# Patient Record
(demographics unavailable — no encounter records)

---

## 2024-10-07 NOTE — PHYSICAL EXAM
[Alert] : alert [Well Nourished] : well nourished [No Acute Distress] : no acute distress [Well Developed] : well developed [Normal Sclera/Conjunctiva] : normal sclera/conjunctiva [EOMI] : extra ocular movement intact [No Proptosis] : no proptosis [Normal Oropharynx] : the oropharynx was normal [Thyroid Not Enlarged] : the thyroid was not enlarged [No Thyroid Nodules] : no palpable thyroid nodules [No Respiratory Distress] : no respiratory distress [No Accessory Muscle Use] : no accessory muscle use [Clear to Auscultation] : lungs were clear to auscultation bilaterally [Normal S1, S2] : normal S1 and S2 [Normal Rate] : heart rate was normal [Regular Rhythm] : with a regular rhythm [No Edema] : no peripheral edema [Normal Bowel Sounds] : normal bowel sounds [Not Tender] : non-tender [Not Distended] : not distended [Soft] : abdomen soft [No Spinal Tenderness] : no spinal tenderness [Scoliosis] : scoliosis present [No Stigmata of Cushings Syndrome] : no stigmata of Cushings Syndrome [Normal Gait] : normal gait [Normal Strength/Tone] : muscle strength and tone were normal [Normal Reflexes] : deep tendon reflexes were 2+ and symmetric [No Tremors] : no tremors [Oriented x3] : oriented to person, place, and time

## 2025-06-26 NOTE — PHYSICAL EXAM
[de-identified] : She moves much better today.  She can tiptoe and heel walk.  Reflexes are trace to 1+ and symmetrical and surprisingly straight leg raising is negative to 90 degrees in the sitting position bilaterally.

## 2025-06-26 NOTE — HISTORY OF PRESENT ILLNESS
[de-identified] : She returns today for follow-up of her back and right leg pain.  She was seen 3 weeks ago with severe pain and could barely get out of the wheelchair for an exam.  She has been on ibuprofen 800 mg 3 times a day along with omeprazole 40 mg once a day for GI protection.  She is also on famotidine.  She has had some mild GI side effects.  When her symptoms started she had back pain graded as a 10 without leg pain and by the time I saw her the back pain was down to a 4 or 5 but she had severe leg pain graded as a 10.  Currently the back is no worse than an intermittent discomfort and worse with standing and walking.  The leg pain is intermittent and in the morning can be as bad as a 6 but during the day is only a discomfort or an ache.  She has made considerable progress in 3 weeks. [Pain Location] : pain [Improving] : improving

## 2025-06-26 NOTE — DISCUSSION/SUMMARY
[Medication Risks Reviewed] : Medication risks reviewed [de-identified] : She will continue ibuprofen 800 mg 3 times a day.  I have given her a prescription for 600 mg and when she has no ache or pain in the morning, only a discomfort or less, she will lower the dose to 600 mg 3 times a day.  If the ibuprofen 800 mg runs out and she is on the 600 with still some ache or pain she can add an Advil to that.  She will call if there are problems with the medication or worsening of her symptoms and I will see her for follow-up in 4 weeks.  She had a list of handwritten questions all of which were answered.

## 2025-07-24 NOTE — HISTORY OF PRESENT ILLNESS
[de-identified] : I first saw this 73-year-old woman in early June with symptoms of lower back pain that began after a protracted cough.  The back pain was initially graded as a 10 but by the time I first saw it had decreased to a 4 or 5 but in the interim she developed right buttock and leg pain that was graded as a 10.  She was started on ibuprofen 800 mg 3 times a day which she tolerated and at follow-up the back pain was only an intermittent discomfort in the leg pain and intermittent discomfort or ache.  She finished the ibuprofen 800 mg 3 times a day and lowered the dose to 600 mg 3 times a day and all the symptoms are dramatically better.  A week or so ago she was on a long car ride to [facility] and the symptoms of back pain again worsened somewhere between an intermittent 5 and 6 and she developed some pain into both anterior thighs.

## 2025-07-24 NOTE — DISCUSSION/SUMMARY
[Medication Risks Reviewed] : Medication risks reviewed [de-identified] : She will increase the ibuprofen to 800 mg 3 times a day again and continue it for 4 or 5 days at that dose until she is back to the excellent baseline before her recent exacerbation of symptoms.  She will then lower the dose to 600 mg 3 times a day and I will see her for follow-up in 3 weeks.  She asked about driving this upcoming weekend an hour and 1/2 to 2 hours to [place] and [facility] but I think that would clearly be counterproductive.  She also asks about walking her dog for 30 to 45 minutes in the morning and when I first saw her she told me that walking aggravated the symptoms so that would also be clearly counterproductive.

## 2025-07-24 NOTE — PHYSICAL EXAM
[de-identified] : On exam today reflexes are symmetrical.  Motor power is normal to manual testing in all lower extremity groups and straight leg raising is negative to 90 degrees bilaterally.

## 2025-07-28 NOTE — HISTORY OF PRESENT ILLNESS
[Stable] : stable [de-identified] : 73 year old female presents for evaluation of right groin pain x 10 days. She notes she initially had sciatic pain of the RLE that has now resolved and now is localized to the groin.  Denies injury. She states that the pain is at the left groin and now the pain is at the right. She notes that the pain has been worsening.  Denies any back pain or radicular pain. She has previously seen Dr. Navarro for right leg sciatic pain which has resolved.  Walking aggravates the pain.  Has been taking ibuprofen and advil but no relief.  Has not tried PT or chiropractic care.  PMHx: HTN,  No fever chills sweats nausea vomiting no bowel or bladder dysfunction, no recent weight loss or gain no night pain. This history is in addition to the intake form that I personally reviewed.

## 2025-07-28 NOTE — PHYSICAL EXAM
[Antalgic] : antalgic [Cano's Sign] : negative Cano's sign [Pronator Drift] : negative pronator drift [SLR] : negative straight leg raise [de-identified] : 5 out of 5 motor strength, sensation is intact and symmetrical full range of motion flexion extension and rotation, no palpatory tenderness full range of motion of hips knees shoulders and elbows (all four extremities), no atrophy, negative straight leg raise, no pathological reflexes, no swelling, normal ambulation, no apparent distress skin is intact, no palpable lymph nodes, no upper or lower extremity instability, alert and oriented x3 and normal mood. Normal finger-to nose test.  Pain with right hip ROM. [de-identified] : XR AP Pelvis 7/28/25-No obvious fracture, some hip arthritis-reviewed with patient and .  XR AP Lat Lumbar 06/05/2025-Lumbar degenerative disc diseased-reviewed with patient.

## 2025-07-28 NOTE — DISCUSSION/SUMMARY
[de-identified] : right hip pain. Discussed all options. MRI pelvis to assess right hip pathology. All options discussed including rest, medicine, home exercise, acupuncture, Chiropractic care, Physical Therapy, Pain management, and last resort surgery. All questions were answered, all alternatives discussed and the patient is in complete agreement with the treatment plan which the patient contributed to and discussed with me through the shared decision making process. Follow-up appointment as instructed. Any issues and the patient will call or come in sooner.    agrees with the plan.